# Patient Record
Sex: FEMALE | Race: WHITE | NOT HISPANIC OR LATINO | Employment: UNEMPLOYED | URBAN - NONMETROPOLITAN AREA
[De-identification: names, ages, dates, MRNs, and addresses within clinical notes are randomized per-mention and may not be internally consistent; named-entity substitution may affect disease eponyms.]

---

## 2024-03-28 ENCOUNTER — TELEPHONE (OUTPATIENT)
Dept: FAMILY MEDICINE CLINIC | Facility: CLINIC | Age: 34
End: 2024-03-28
Payer: COMMERCIAL

## 2024-03-28 DIAGNOSIS — Z00.00 PHYSICAL EXAM: Primary | ICD-10-CM

## 2024-03-28 NOTE — TELEPHONE ENCOUNTER
Caller: Viktoria Stauffer    Relationship: Self    Best call back number: [phone]     What orders are you requesting (i.e. lab or imaging): FASTING ANNUAL BLOOD WORK    In what timeframe would the patient need to come in: ASAP        Additional notes: PATIENT HAS AN APPT ON 4/4/24 AND WOULD LIKE TO DO BLOODWORK PRIOR SO THEY CAN BE DISCUSSED AT APPT. ONCE SUBMITTED ADVISE  SO PATIENT CAN BE CALLED TO SETUP APPT

## 2024-04-04 ENCOUNTER — OFFICE VISIT (OUTPATIENT)
Dept: FAMILY MEDICINE CLINIC | Facility: CLINIC | Age: 34
End: 2024-04-04
Payer: COMMERCIAL

## 2024-04-04 VITALS
TEMPERATURE: 97.5 F | RESPIRATION RATE: 16 BRPM | DIASTOLIC BLOOD PRESSURE: 74 MMHG | HEART RATE: 81 BPM | OXYGEN SATURATION: 99 % | HEIGHT: 66 IN | BODY MASS INDEX: 31.43 KG/M2 | SYSTOLIC BLOOD PRESSURE: 118 MMHG | WEIGHT: 195.6 LBS

## 2024-04-04 DIAGNOSIS — E66.09 CLASS 1 OBESITY DUE TO EXCESS CALORIES WITHOUT SERIOUS COMORBIDITY WITH BODY MASS INDEX (BMI) OF 31.0 TO 31.9 IN ADULT: Primary | ICD-10-CM

## 2024-04-04 DIAGNOSIS — Z00.00 PHYSICAL EXAM: ICD-10-CM

## 2024-04-04 PROBLEM — E66.811 CLASS 1 OBESITY DUE TO EXCESS CALORIES WITHOUT SERIOUS COMORBIDITY WITH BODY MASS INDEX (BMI) OF 31.0 TO 31.9 IN ADULT: Status: ACTIVE | Noted: 2024-04-04

## 2024-04-04 RX ORDER — NORETHINDRONE 0.35 MG/1
TABLET ORAL
COMMUNITY
Start: 2023-05-25

## 2024-04-04 RX ORDER — SERTRALINE HCL 25 MG
TABLET ORAL
COMMUNITY
Start: 2023-05-25

## 2024-04-04 NOTE — ASSESSMENT & PLAN NOTE
Patient's (Body mass index is 31.57 kg/m².) indicates that they are obese (BMI >30) with health conditions that include none . Weight is newly identified. BMI  is above average; BMI management plan is completed. We discussed portion control, increasing exercise, and an lee-based approach such as MyFitness Pal or Lose It.

## 2024-04-04 NOTE — PROGRESS NOTES
"    Office Note     Name: Viktoria Stauffer    : 1990     MRN: [id number]     Chief Complaint  Establish Care (\"General health and possibly weight loss discussion\" )    Subjective     History of Present Illness:  Viktoria Stauffer is a 34 y.o. female who presents today for:    - patient got .   patient had a well check and ALT was 17 weight 133.  In 2016 weight 144.  2017 weight 139.8.  2018 weight 145.  2019 had a baby in September ALT was 14 and weight was 149.   weight was 181.   weight 186.   weight 179.   had a baby in May.   -Most recent labs this year showed ALT of 68.  Weight today 195    -eats mostly at home  -does enjoy sweets      Past Medical History:   Past Medical History:   Diagnosis Date    Anxiety 10/01/2019    Took Sertraline for 6mo. after birth of 1st child in . Stopped it. Taking Zoloft 25mg since birth of 2nd child in May 2023.    History of medical problems     Left leg has a large varicose vein    Obesity 2020    Never \"lost\" pregnancy weight from 1st child. Added more with 2nd child.       Past Surgical History: History reviewed. No pertinent surgical history.    Immunizations:   Immunization History   Administered Date(s) Administered    COVID-19 (MODERNA) 1st,2nd,3rd Dose Monovalent 2021, 2021    COVID-19 (MODERNA) Monovalent Original Booster 2021    COVID-19 (PFIZER) BIVALENT 12+YRS 2022    COVID-19 F23 (PFIZER) 12YRS+ (COMIRNATY) 10/21/2023        Medications:     Current Outpatient Medications:     Jencycla 0.35 MG tablet, , Disp: , Rfl:     Zoloft 25 MG tablet, , Disp: , Rfl:     Allergies:   No Known Allergies    Family History:   Family History   Problem Relation Age of Onset    Alcohol abuse Father         drinks daily       Social History:   Social History     Socioeconomic History    Marital status:    Tobacco Use    Smoking status: Never    Smokeless tobacco: Never   Substance and Sexual Activity    Alcohol " "use: Yes     Alcohol/week: 1.0 standard drink of alcohol     Types: 1 Glasses of wine per week     Comment: Casual    Drug use: Never    Sexual activity: Yes     Partners: Male     Birth control/protection: Birth control pill         Objective     Vital Signs  /74 (BP Location: Left arm, Patient Position: Sitting, Cuff Size: Adult)   Pulse 81   Temp 97.5 °F (36.4 °C) (Temporal)   Resp 16   Ht 167.6 cm (66\")   Wt 88.7 kg (195 lb 9.6 oz)   SpO2 99%   BMI 31.57 kg/m²   Estimated body mass index is 31.57 kg/m² as calculated from the following:    Height as of this encounter: 167.6 cm (66\").    Weight as of this encounter: 88.7 kg (195 lb 9.6 oz).        Physical Exam  Constitutional:       General: She is not in acute distress.     Appearance: Normal appearance.   HENT:      Head: Normocephalic and atraumatic.   Eyes:      Conjunctiva/sclera: Conjunctivae normal.   Pulmonary:      Effort: Pulmonary effort is normal.   Neurological:      Mental Status: She is alert.   Psychiatric:         Mood and Affect: Mood normal.         Behavior: Behavior normal.         Thought Content: Thought content normal.          Assessment and Plan     Diagnoses and all orders for this visit:    1. Class 1 obesity due to excess calories without serious comorbidity with body mass index (BMI) of 31.0 to 31.9 in adult (Primary)  Assessment & Plan:  Patient's (Body mass index is 31.57 kg/m².) indicates that they are obese (BMI >30) with health conditions that include none . Weight is newly identified. BMI  is above average; BMI management plan is completed. We discussed portion control, increasing exercise, and an lee-based approach such as [employer] Pal or Lose It.       2. Physical exam  -     Hemoglobin A1c; Future  -     Vitamin D,25-Hydroxy; Future  -     Hemoglobin A1c  -     Vitamin D,25-Hydroxy      Patient presents today to discuss weight.  We did go over her yearly healthcare that she had done through insurance at work.  " She does have 1 elevated liver enzyme.  She has not had a liver ultrasound.  Will work on lifestyle modifications with a goal to lose weight and recheck in 6 months.  If still elevated will pursue a liver ultrasound.  We did extensive counseling on dietary changes and I did recommend that she log her food on an lee in order to make sure she is hitting protein and fiber goals.  She will follow-up in 4 weeks       Follow Up  Return in about 4 weeks (around 5/2/2024) for Next scheduled follow up.    DO YANG Krishnan [facility]  [address] [place] [id number]  [phone]    NOTE TO PATIENT: The 21st Century Cures Act makes medical notes like these available to patients in the interest of transparency. However, be advised this is a medical document. It is intended as peer to peer communication. It is written in medical language and may contain abbreviations or verbiage that are unfamiliar. It may appear blunt or direct. Medical documents are intended to carry relevant information, facts as evident, and the clinical opinion of the practitioner.

## 2024-04-05 LAB
25(OH)D3+25(OH)D2 SERPL-MCNC: 19.9 NG/ML (ref 30–100)
HBA1C MFR BLD: 5.5 % (ref 4.8–5.6)

## 2024-05-02 ENCOUNTER — OFFICE VISIT (OUTPATIENT)
Dept: FAMILY MEDICINE CLINIC | Facility: CLINIC | Age: 34
End: 2024-05-02
Payer: COMMERCIAL

## 2024-05-02 VITALS
BODY MASS INDEX: 31.34 KG/M2 | DIASTOLIC BLOOD PRESSURE: 84 MMHG | HEART RATE: 76 BPM | SYSTOLIC BLOOD PRESSURE: 126 MMHG | WEIGHT: 195 LBS | HEIGHT: 66 IN | OXYGEN SATURATION: 98 %

## 2024-05-02 DIAGNOSIS — E66.09 CLASS 1 OBESITY DUE TO EXCESS CALORIES WITHOUT SERIOUS COMORBIDITY WITH BODY MASS INDEX (BMI) OF 31.0 TO 31.9 IN ADULT: Primary | ICD-10-CM

## 2024-05-02 PROCEDURE — 99214 OFFICE O/P EST MOD 30 MIN: CPT | Performed by: STUDENT IN AN ORGANIZED HEALTH CARE EDUCATION/TRAINING PROGRAM

## 2024-05-02 NOTE — PROGRESS NOTES
"    Office Note     Name: Viktoria Stauffer    : 1990     MRN: [id number]     Chief Complaint  Weight Loss (1 month check up, diet and exercise )    Subjective     History of Present Illness:  Viktoria Stauffer is a 34 y.o. female who presents today for:    -has been on more walks  -doing 10 min exercising from [employer]  -working on logging food  -has noticed she is eating emotionally, would like to seek out counseling   -getting enough protein but overall calorie count is still too high to lose weight  -drinking at least 60oz per day     Past Medical History:   Past Medical History:   Diagnosis Date    Anxiety 10/01/2019    Took Sertraline for 6mo. after birth of 1st child in . Stopped it. Taking Zoloft 25mg since birth of 2nd child in May 2023.    History of medical problems     Left leg has a large varicose vein    Obesity 2020    Never \"lost\" pregnancy weight from 1st child. Added more with 2nd child.       Past Surgical History: History reviewed. No pertinent surgical history.    Immunizations:   Immunization History   Administered Date(s) Administered    COVID-19 (MODERNA) 1st,2nd,3rd Dose Monovalent 2021, 2021    COVID-19 (MODERNA) Monovalent Original Booster 2021    COVID-19 (PFIZER) BIVALENT 12+YRS 2022    COVID-19 F23 (PFIZER) 12YRS+ (COMIRNATY) 10/21/2023        Medications:     Current Outpatient Medications:     Jencycla 0.35 MG tablet, , Disp: , Rfl:     Zoloft 25 MG tablet, , Disp: , Rfl:     Allergies:   No Known Allergies    Family History:   Family History   Problem Relation Age of Onset    Alcohol abuse Father         drinks daily       Social History:   Social History     Socioeconomic History    Marital status:    Tobacco Use    Smoking status: Never    Smokeless tobacco: Never   Vaping Use    Vaping status: Never Used   Substance and Sexual Activity    Alcohol use: Yes     Alcohol/week: 1.0 standard drink of alcohol     Types: 1 Glasses of wine per week " "    Comment: Casual    Drug use: Never    Sexual activity: Yes     Partners: Male     Birth control/protection: Birth control pill         Objective     Vital Signs  /84 (BP Location: Left arm, Patient Position: Sitting, Cuff Size: Adult)   Pulse 76   Ht 167.6 cm (65.98\")   Wt 88.5 kg (195 lb)   SpO2 98%   BMI 31.49 kg/m²   Estimated body mass index is 31.49 kg/m² as calculated from the following:    Height as of this encounter: 167.6 cm (65.98\").    Weight as of this encounter: 88.5 kg (195 lb).            Physical Exam  Constitutional:       General: She is not in acute distress.     Appearance: Normal appearance.   HENT:      Head: Normocephalic and atraumatic.   Eyes:      Conjunctiva/sclera: Conjunctivae normal.   Pulmonary:      Effort: Pulmonary effort is normal.   Neurological:      Mental Status: She is alert.   Psychiatric:         Mood and Affect: Mood normal.         Behavior: Behavior normal.         Thought Content: Thought content normal.          Assessment and Plan     Diagnoses and all orders for this visit:    1. Class 1 obesity due to excess calories without serious comorbidity with body mass index (BMI) of 31.0 to 31.9 in adult (Primary)    Patient presents today to follow-up on lifestyle modifications for weight loss.  Weight is the same as it was 4 weeks ago.  She has become more active and she is looking foods.  She is setting her protein goals but overall calorie count is too high to lose weight.  She will work on that.  I recommended she drink more water.  She can continue to increase her physical activity as well.  She has noticed emotional eating components.  Is interested in seeking counseling.  We discussed counseling options and she we will review the psychology today website and find someone who feels like it is compatible.  Let her know that we can send a referral if necessary.  We did discuss medications again today.  She would like to avoid for now but does plan to " enroll in a 90-day course through her insurance company so that we could have the option of medication coverage in the future.  Will have her follow-up in 3 months and we will repeat blood work at that visit.         Follow Up  Return in about 3 months (around 8/2/2024) for Next scheduled follow up.    DO YANG Krishnan [facility]  [address] [facility] [id number] [phone]    NOTE TO PATIENT: The 21st Century Cures Act makes medical notes like these available to patients in the interest of transparency. However, be advised this is a medical document. It is intended as peer to peer communication. It is written in medical language and may contain abbreviations or verbiage that are unfamiliar. It may appear blunt or direct. Medical documents are intended to carry relevant information, facts as evident, and the clinical opinion of the practitioner.

## 2024-07-29 ENCOUNTER — TELEPHONE (OUTPATIENT)
Dept: FAMILY MEDICINE CLINIC | Facility: CLINIC | Age: 34
End: 2024-07-29

## 2024-07-29 DIAGNOSIS — Z00.00 ENCOUNTER FOR WELLNESS EXAMINATION IN ADULT: Primary | ICD-10-CM

## 2024-07-29 NOTE — TELEPHONE ENCOUNTER
Caller: Viktoria Stauffer    Relationship: Self    Best call back number: [phone]    What orders are you requesting (i.e. lab or imaging): LABS    In what timeframe would the patient need to come in:     BEFORE APPOINTMENT 8/2  WANTS TO HAVE RESULTS AT HER APPOINTMENT    Additional notes:     PLEASE CALL WHEN ORDERS HAVE BEEN PUT IN SO SHE CAN COME GET BLOOD HEIDY

## 2024-07-30 ENCOUNTER — LAB (OUTPATIENT)
Dept: FAMILY MEDICINE CLINIC | Facility: CLINIC | Age: 34
End: 2024-07-30
Payer: COMMERCIAL

## 2024-07-30 DIAGNOSIS — Z00.00 ENCOUNTER FOR WELLNESS EXAMINATION IN ADULT: ICD-10-CM

## 2024-07-30 PROCEDURE — 36415 COLL VENOUS BLD VENIPUNCTURE: CPT | Performed by: STUDENT IN AN ORGANIZED HEALTH CARE EDUCATION/TRAINING PROGRAM

## 2024-07-31 LAB
ALBUMIN SERPL-MCNC: 4.5 G/DL (ref 3.9–4.9)
ALP SERPL-CCNC: 103 IU/L (ref 44–121)
ALT SERPL-CCNC: 29 IU/L (ref 0–32)
AST SERPL-CCNC: 19 IU/L (ref 0–40)
BASOPHILS # BLD AUTO: 0 X10E3/UL (ref 0–0.2)
BASOPHILS NFR BLD AUTO: 1 %
BILIRUB SERPL-MCNC: 0.3 MG/DL (ref 0–1.2)
BUN SERPL-MCNC: 16 MG/DL (ref 6–20)
BUN/CREAT SERPL: 22 (ref 9–23)
CALCIUM SERPL-MCNC: 9.2 MG/DL (ref 8.7–10.2)
CHLORIDE SERPL-SCNC: 105 MMOL/L (ref 96–106)
CHOLEST SERPL-MCNC: 214 MG/DL (ref 100–199)
CO2 SERPL-SCNC: 22 MMOL/L (ref 20–29)
CREAT SERPL-MCNC: 0.74 MG/DL (ref 0.57–1)
EGFRCR SERPLBLD CKD-EPI 2021: 107 ML/MIN/1.73
EOSINOPHIL # BLD AUTO: 0.1 X10E3/UL (ref 0–0.4)
EOSINOPHIL NFR BLD AUTO: 1 %
ERYTHROCYTE [DISTWIDTH] IN BLOOD BY AUTOMATED COUNT: 12.9 % (ref 11.7–15.4)
GLOBULIN SER CALC-MCNC: 2.4 G/DL (ref 1.5–4.5)
GLUCOSE SERPL-MCNC: 83 MG/DL (ref 70–99)
HBA1C MFR BLD: 5.6 % (ref 4.8–5.6)
HCT VFR BLD AUTO: 43.8 % (ref 34–46.6)
HCV IGG SERPL QL IA: NON REACTIVE
HDLC SERPL-MCNC: 58 MG/DL
HGB BLD-MCNC: 14 G/DL (ref 11.1–15.9)
IMM GRANULOCYTES # BLD AUTO: 0 X10E3/UL (ref 0–0.1)
IMM GRANULOCYTES NFR BLD AUTO: 0 %
LDLC SERPL CALC-MCNC: 138 MG/DL (ref 0–99)
LYMPHOCYTES # BLD AUTO: 1.9 X10E3/UL (ref 0.7–3.1)
LYMPHOCYTES NFR BLD AUTO: 31 %
MCH RBC QN AUTO: 27.8 PG (ref 26.6–33)
MCHC RBC AUTO-ENTMCNC: 32 G/DL (ref 31.5–35.7)
MCV RBC AUTO: 87 FL (ref 79–97)
MONOCYTES # BLD AUTO: 0.4 X10E3/UL (ref 0.1–0.9)
MONOCYTES NFR BLD AUTO: 6 %
NEUTROPHILS # BLD AUTO: 3.8 X10E3/UL (ref 1.4–7)
NEUTROPHILS NFR BLD AUTO: 61 %
PLATELET # BLD AUTO: 257 X10E3/UL (ref 150–450)
POTASSIUM SERPL-SCNC: 4.7 MMOL/L (ref 3.5–5.2)
PROT SERPL-MCNC: 6.9 G/DL (ref 6–8.5)
RBC # BLD AUTO: 5.04 X10E6/UL (ref 3.77–5.28)
SODIUM SERPL-SCNC: 140 MMOL/L (ref 134–144)
TRIGL SERPL-MCNC: 101 MG/DL (ref 0–149)
VLDLC SERPL CALC-MCNC: 18 MG/DL (ref 5–40)
WBC # BLD AUTO: 6.2 X10E3/UL (ref 3.4–10.8)

## 2024-08-02 ENCOUNTER — OFFICE VISIT (OUTPATIENT)
Dept: FAMILY MEDICINE CLINIC | Facility: CLINIC | Age: 34
End: 2024-08-02
Payer: COMMERCIAL

## 2024-08-02 VITALS
BODY MASS INDEX: 31.48 KG/M2 | HEIGHT: 66 IN | RESPIRATION RATE: 16 BRPM | HEART RATE: 81 BPM | WEIGHT: 195.9 LBS | DIASTOLIC BLOOD PRESSURE: 68 MMHG | SYSTOLIC BLOOD PRESSURE: 120 MMHG | OXYGEN SATURATION: 99 %

## 2024-08-02 DIAGNOSIS — E66.09 CLASS 1 OBESITY DUE TO EXCESS CALORIES WITHOUT SERIOUS COMORBIDITY WITH BODY MASS INDEX (BMI) OF 31.0 TO 31.9 IN ADULT: Primary | ICD-10-CM

## 2024-08-02 DIAGNOSIS — F32.A DEPRESSION, UNSPECIFIED DEPRESSION TYPE: ICD-10-CM

## 2024-08-02 DIAGNOSIS — E78.2 MIXED HYPERLIPIDEMIA: ICD-10-CM

## 2024-08-02 PROCEDURE — 99214 OFFICE O/P EST MOD 30 MIN: CPT | Performed by: STUDENT IN AN ORGANIZED HEALTH CARE EDUCATION/TRAINING PROGRAM

## 2024-08-02 RX ORDER — BUPROPION HYDROCHLORIDE 150 MG/1
150 TABLET ORAL DAILY
Qty: 90 TABLET | Refills: 1 | Status: SHIPPED | OUTPATIENT
Start: 2024-08-02

## 2024-08-02 RX ORDER — ERGOCALCIFEROL 1.25 MG/1
50000 CAPSULE ORAL WEEKLY
COMMUNITY

## 2024-08-02 NOTE — PROGRESS NOTES
"    Office Note     Name: Viktoria Stauffer    : 1990     MRN: [id number]     Chief Complaint  Obesity (3 month f/u) and Hyperlipidemia    Subjective     History of Present Illness:  Viktoria Stauffer is a 34 y.o. female who presents today for:    Obesity  -Has not lost weight despite implementing some of the eating changes.  Not working out routinely    HLD  -Working on lifestyle modifications    Mood  -Has been on low-dose of Zoloft which has been helpful    Past Medical History:   Past Medical History:   Diagnosis Date    Anxiety 10/01/2019    Took Sertraline for 6mo. after birth of 1st child in . Stopped it. Taking Zoloft 25mg since birth of 2nd child in May 2023.    History of medical problems     Left leg has a large varicose vein    Obesity 2020    Never \"lost\" pregnancy weight from 1st child. Added more with 2nd child.       Past Surgical History: History reviewed. No pertinent surgical history.    Immunizations:   Immunization History   Administered Date(s) Administered    COVID-19 (MODERNA) 1st,2nd,3rd Dose Monovalent 2021, 2021    COVID-19 (MODERNA) Monovalent Original Booster 2021    COVID-19 (PFIZER) BIVALENT 12+YRS 2022    COVID-19 F23 (PFIZER) 12YRS+ (COMIRNATY) 10/21/2023        Medications:     Current Outpatient Medications:     Jencycla 0.35 MG tablet, , Disp: , Rfl:     vitamin D (ERGOCALCIFEROL) 1.25 MG (96210 UT) capsule capsule, Take 1 capsule by mouth 1 (One) Time Per Week., Disp: , Rfl:     buPROPion XL (Wellbutrin XL) 150 MG 24 hr tablet, Take 1 tablet by mouth Daily., Disp: 90 tablet, Rfl: 1    Allergies:   No Known Allergies    Family History:   Family History   Problem Relation Age of Onset    Alcohol abuse Father         drinks daily       Social History:   Social History     Socioeconomic History    Marital status:    Tobacco Use    Smoking status: Never     Passive exposure: Never    Smokeless tobacco: Never   Vaping Use    Vaping status: " "Never Used   Substance and Sexual Activity    Alcohol use: Yes     Alcohol/week: 1.0 standard drink of alcohol     Types: 1 Glasses of wine per week     Comment: Casual    Drug use: Never    Sexual activity: Yes     Partners: Male     Birth control/protection: Birth control pill         Objective     Vital Signs  /68 (BP Location: Right arm, Patient Position: Sitting, Cuff Size: Large Adult)   Pulse 81   Resp 16   Ht 167.6 cm (65.98\")   Wt 88.9 kg (195 lb 14.4 oz)   SpO2 99%   BMI 31.64 kg/m²   Estimated body mass index is 31.64 kg/m² as calculated from the following:    Height as of this encounter: 167.6 cm (65.98\").    Weight as of this encounter: 88.9 kg (195 lb 14.4 oz).            Physical Exam  Constitutional:       General: She is not in acute distress.     Appearance: Normal appearance.   HENT:      Head: Normocephalic and atraumatic.   Eyes:      Conjunctiva/sclera: Conjunctivae normal.   Pulmonary:      Effort: Pulmonary effort is normal.   Neurological:      Mental Status: She is alert.   Psychiatric:         Mood and Affect: Mood normal.         Behavior: Behavior normal.         Thought Content: Thought content normal.          Assessment and Plan     Diagnoses and all orders for this visit:    1. Class 1 obesity due to excess calories without serious comorbidity with body mass index (BMI) of 31.0 to 31.9 in adult (Primary)  -     buPROPion XL (Wellbutrin XL) 150 MG 24 hr tablet; Take 1 tablet by mouth Daily.  Dispense: 90 tablet; Refill: 1    2. Mixed hyperlipidemia  -     Cancel: Lipid Panel; Future    3. Depression, unspecified depression type      Regarding obesity at last appointment we discussed the possibility of medications.  Patient is still nursing her 15-month-old child.  Discussed that GLP-1 medications are not considered safe during breast-feeding.  Discussed other medication options.  On medication we discussed his Contrave and would want to avoid naltrexone during " breast-feeding but could try Wellbutrin and discontinue her Zoloft which may help both mood and the emotional component of eating which does tend to make it difficult for her to lose weight.    Patient also here to follow-up on cholesterol and she already had her lipids rechecked.  They are somewhat improved.  We will continue to work on lifestyle modifications.    For depression we will be transitioning from Zoloft to Wellbutrin.  Follow-up in 6 months           Follow Up  Return in about 6 months (around 2/2/2025) for Next scheduled follow up.    DO YANG Krishnan [facility]  [address] [place] [id number]  [phone]    NOTE TO PATIENT: The 21st Century Cures Act makes medical notes like these available to patients in the interest of transparency. However, be advised this is a medical document. It is intended as peer to peer communication. It is written in medical language and may contain abbreviations or verbiage that are unfamiliar. It may appear blunt or direct. Medical documents are intended to carry relevant information, facts as evident, and the clinical opinion of the practitioner.

## 2024-08-31 ENCOUNTER — OFFICE VISIT (OUTPATIENT)
Age: 34
End: 2024-08-31

## 2024-08-31 VITALS
WEIGHT: 194 LBS | OXYGEN SATURATION: 98 % | DIASTOLIC BLOOD PRESSURE: 78 MMHG | BODY MASS INDEX: 33.12 KG/M2 | SYSTOLIC BLOOD PRESSURE: 130 MMHG | HEART RATE: 104 BPM | TEMPERATURE: 97.6 F | HEIGHT: 64 IN

## 2024-08-31 DIAGNOSIS — L24.9 IRRITANT CONTACT DERMATITIS, UNSPECIFIED TRIGGER: Primary | ICD-10-CM

## 2024-08-31 RX ORDER — DEXAMETHASONE SODIUM PHOSPHATE 4 MG/ML
4 INJECTION, SOLUTION INTRA-ARTICULAR; INTRALESIONAL; INTRAMUSCULAR; INTRAVENOUS; SOFT TISSUE ONCE
Status: COMPLETED | OUTPATIENT
Start: 2024-08-31 | End: 2024-08-31

## 2024-08-31 RX ORDER — BUPROPION HYDROCHLORIDE 150 MG/1
150 TABLET ORAL DAILY
COMMUNITY
Start: 2024-08-02

## 2024-08-31 RX ORDER — ERGOCALCIFEROL 1.25 MG/1
50000 CAPSULE, LIQUID FILLED ORAL WEEKLY
COMMUNITY

## 2024-08-31 RX ORDER — PREDNISONE 20 MG/1
20 TABLET ORAL 2 TIMES DAILY
Qty: 10 TABLET | Refills: 0 | Status: SHIPPED | OUTPATIENT
Start: 2024-08-31 | End: 2024-09-05

## 2024-08-31 RX ORDER — DEXAMETHASONE SODIUM PHOSPHATE 4 MG/ML
4 INJECTION, SOLUTION INTRA-ARTICULAR; INTRALESIONAL; INTRAMUSCULAR; INTRAVENOUS; SOFT TISSUE ONCE
Status: DISCONTINUED | OUTPATIENT
Start: 2024-08-31 | End: 2024-08-31

## 2024-08-31 RX ADMIN — DEXAMETHASONE SODIUM PHOSPHATE 4 MG: 4 INJECTION, SOLUTION INTRA-ARTICULAR; INTRALESIONAL; INTRAMUSCULAR; INTRAVENOUS; SOFT TISSUE at 16:47

## 2024-08-31 NOTE — PROGRESS NOTES
Natividad Wang (:  1990) is a 34 y.o. female,New patient, here for evaluation of the following chief complaint(s):  Rash (Patient c/o having a rash/hives on her arms, legs, face and stomach x3 days)      ASSESSMENT/PLAN:    ICD-10-CM    1. Irritant contact dermatitis, unspecified trigger  L24.9 predniSONE (DELTASONE) 20 MG tablet     dexAMETHasone (DECADRON) injection 4 mg     DISCONTINUED: dexAMETHasone (DECADRON) injection 4 mg        Patient is experiencing contact dermatitis, likely from heat and sweat based on areas of rash. She received a decadron injection while in the clinic today. Encouraged her to take prednisone as prescribed, beginning tomorrow, keep areas dry and open to air, refrain from scratching, and use topical anti itch as needed. Return for worsening or persistent symptoms. Patient is understanding and agreeable to this plan.     Dx Disposition: heat rash, fungal rash, allergic dermatitis  Education and handout provided on diagnosis and management of symptoms.   AVS reviewed with patient. Follow up as needed in UC or with PCP for new or worsening symptoms.   Return if symptoms worsen or fail to improve.    SUBJECTIVE/OBJECTIVE:  Patient presents to the clinic with complaints of itchy hives all over. This started Thursday evening. Denies any changes in detergents, soaps, lotions, etc. She did switch from zoloft to wellbutrin about one month ago. She has had a stressful week. She has tried benadryl with no relief.        History provided by:  Patient   used: No    Rash  Pertinent negatives include no fatigue or fever.       Vitals:    24 1548   BP: 130/78   Site: Right Upper Arm   Position: Sitting   Cuff Size: Large Adult   Pulse: (!) 104   Temp: 97.6 °F (36.4 °C)   TempSrc: Oral   SpO2: 98%   Weight: 88 kg (194 lb)   Height: 1.626 m (5' 4\")       Review of Systems   Constitutional:  Negative for chills, fatigue and fever.   Musculoskeletal:  Negative for myalgias.

## 2025-01-30 DIAGNOSIS — Z00.00 ENCOUNTER FOR WELLNESS EXAMINATION IN ADULT: Primary | ICD-10-CM

## 2025-02-03 ENCOUNTER — LAB (OUTPATIENT)
Dept: FAMILY MEDICINE CLINIC | Facility: CLINIC | Age: 35
End: 2025-02-03
Payer: COMMERCIAL

## 2025-02-03 DIAGNOSIS — Z00.00 ENCOUNTER FOR WELLNESS EXAMINATION IN ADULT: ICD-10-CM

## 2025-02-04 LAB
25(OH)D3+25(OH)D2 SERPL-MCNC: 12.9 NG/ML (ref 30–100)
ALBUMIN SERPL-MCNC: 4.8 G/DL (ref 3.9–4.9)
ALP SERPL-CCNC: 84 IU/L (ref 44–121)
ALT SERPL-CCNC: 12 IU/L (ref 0–32)
AST SERPL-CCNC: 16 IU/L (ref 0–40)
BASOPHILS # BLD AUTO: 0 X10E3/UL (ref 0–0.2)
BASOPHILS NFR BLD AUTO: 1 %
BILIRUB SERPL-MCNC: <0.2 MG/DL (ref 0–1.2)
BUN SERPL-MCNC: 12 MG/DL (ref 6–20)
BUN/CREAT SERPL: 16 (ref 9–23)
CALCIUM SERPL-MCNC: 9.8 MG/DL (ref 8.7–10.2)
CHLORIDE SERPL-SCNC: 103 MMOL/L (ref 96–106)
CHOLEST SERPL-MCNC: 167 MG/DL (ref 100–199)
CO2 SERPL-SCNC: 22 MMOL/L (ref 20–29)
CREAT SERPL-MCNC: 0.74 MG/DL (ref 0.57–1)
EGFRCR SERPLBLD CKD-EPI 2021: 107 ML/MIN/1.73
EOSINOPHIL # BLD AUTO: 0.3 X10E3/UL (ref 0–0.4)
EOSINOPHIL NFR BLD AUTO: 5 %
ERYTHROCYTE [DISTWIDTH] IN BLOOD BY AUTOMATED COUNT: 12 % (ref 11.7–15.4)
GLOBULIN SER CALC-MCNC: 2.7 G/DL (ref 1.5–4.5)
GLUCOSE SERPL-MCNC: 87 MG/DL (ref 70–99)
HBA1C MFR BLD: 5.3 % (ref 4.8–5.6)
HCT VFR BLD AUTO: 39.6 % (ref 34–46.6)
HDLC SERPL-MCNC: 63 MG/DL
HGB BLD-MCNC: 13 G/DL (ref 11.1–15.9)
IMM GRANULOCYTES # BLD AUTO: 0 X10E3/UL (ref 0–0.1)
IMM GRANULOCYTES NFR BLD AUTO: 0 %
LDLC SERPL CALC-MCNC: 91 MG/DL (ref 0–99)
LYMPHOCYTES # BLD AUTO: 2.8 X10E3/UL (ref 0.7–3.1)
LYMPHOCYTES NFR BLD AUTO: 44 %
MCH RBC QN AUTO: 29.8 PG (ref 26.6–33)
MCHC RBC AUTO-ENTMCNC: 32.8 G/DL (ref 31.5–35.7)
MCV RBC AUTO: 91 FL (ref 79–97)
MONOCYTES # BLD AUTO: 0.4 X10E3/UL (ref 0.1–0.9)
MONOCYTES NFR BLD AUTO: 6 %
NEUTROPHILS # BLD AUTO: 2.8 X10E3/UL (ref 1.4–7)
NEUTROPHILS NFR BLD AUTO: 44 %
PLATELET # BLD AUTO: 282 X10E3/UL (ref 150–450)
POTASSIUM SERPL-SCNC: 4.6 MMOL/L (ref 3.5–5.2)
PROT SERPL-MCNC: 7.5 G/DL (ref 6–8.5)
RBC # BLD AUTO: 4.36 X10E6/UL (ref 3.77–5.28)
SODIUM SERPL-SCNC: 138 MMOL/L (ref 134–144)
T4 FREE SERPL-MCNC: 1.28 NG/DL (ref 0.82–1.77)
TRIGL SERPL-MCNC: 69 MG/DL (ref 0–149)
TSH SERPL DL<=0.005 MIU/L-ACNC: 4 UIU/ML (ref 0.45–4.5)
VLDLC SERPL CALC-MCNC: 13 MG/DL (ref 5–40)
WBC # BLD AUTO: 6.4 X10E3/UL (ref 3.4–10.8)

## 2025-02-05 DIAGNOSIS — E66.09 CLASS 1 OBESITY DUE TO EXCESS CALORIES WITHOUT SERIOUS COMORBIDITY WITH BODY MASS INDEX (BMI) OF 31.0 TO 31.9 IN ADULT: ICD-10-CM

## 2025-02-05 DIAGNOSIS — E66.811 CLASS 1 OBESITY DUE TO EXCESS CALORIES WITHOUT SERIOUS COMORBIDITY WITH BODY MASS INDEX (BMI) OF 31.0 TO 31.9 IN ADULT: ICD-10-CM

## 2025-02-05 RX ORDER — BUPROPION HYDROCHLORIDE 150 MG/1
150 TABLET ORAL DAILY
Qty: 90 TABLET | Refills: 1 | Status: SHIPPED | OUTPATIENT
Start: 2025-02-05

## 2025-02-05 RX ORDER — ERGOCALCIFEROL 1.25 MG/1
50000 CAPSULE, LIQUID FILLED ORAL WEEKLY
Qty: 5 CAPSULE | Refills: 0 | Status: SHIPPED | OUTPATIENT
Start: 2025-02-05

## 2025-02-05 RX ORDER — NORETHINDRONE 0.35 MG/1
TABLET ORAL
Qty: 28 TABLET | Status: CANCELLED | OUTPATIENT
Start: 2025-02-05

## 2025-02-07 ENCOUNTER — OFFICE VISIT (OUTPATIENT)
Dept: FAMILY MEDICINE CLINIC | Facility: CLINIC | Age: 35
End: 2025-02-07
Payer: COMMERCIAL

## 2025-02-07 VITALS
DIASTOLIC BLOOD PRESSURE: 80 MMHG | HEIGHT: 66 IN | SYSTOLIC BLOOD PRESSURE: 110 MMHG | WEIGHT: 182.7 LBS | HEART RATE: 95 BPM | BODY MASS INDEX: 29.36 KG/M2 | OXYGEN SATURATION: 98 %

## 2025-02-07 DIAGNOSIS — E66.811 CLASS 1 OBESITY DUE TO EXCESS CALORIES WITHOUT SERIOUS COMORBIDITY WITH BODY MASS INDEX (BMI) OF 31.0 TO 31.9 IN ADULT: Primary | ICD-10-CM

## 2025-02-07 DIAGNOSIS — E66.09 CLASS 1 OBESITY DUE TO EXCESS CALORIES WITHOUT SERIOUS COMORBIDITY WITH BODY MASS INDEX (BMI) OF 31.0 TO 31.9 IN ADULT: Primary | ICD-10-CM

## 2025-02-07 PROCEDURE — 99213 OFFICE O/P EST LOW 20 MIN: CPT | Performed by: STUDENT IN AN ORGANIZED HEALTH CARE EDUCATION/TRAINING PROGRAM

## 2025-02-07 NOTE — PROGRESS NOTES
"    Office Note     Name: Viktoria Stauffer    : 1990     MRN: [id number]     Chief Complaint  Obesity    Subjective     History of Present Illness:  Viktoria Stauffer is a 34 y.o. female who presents today for:    Obesity  -mid October started weight watchers and lost 10lb  -mid December into January stalled out due to travel and snow. Then got back on the wagon   -was in a good walking routine before the cold. Sometimes doing weights.  -did buy a [employer] blood sugar monitor (CGM)  -interested in who does body fat analysis scans in [facility] women[facility]     Past Medical History:   Past Medical History:   Diagnosis Date    Anxiety 10/01/2019    Took Sertraline for 6mo. after birth of 1st child in . Stopped it. Taking Zoloft 25mg since birth of 2nd child in May 2023.    History of medical problems 2012    Left leg has a large varicose vein    Obesity 2020    Never \"lost\" pregnancy weight from 1st child. Added more with 2nd child.       Past Surgical History: History reviewed. No pertinent surgical history.    Immunizations:   Immunization History   Administered Date(s) Administered    COVID-19 (MODERNA) 1st,2nd,3rd Dose Monovalent 2021, 2021    COVID-19 (MODERNA) Monovalent Original Booster 2021    COVID-19 (PFIZER) 12YRS+ (COMIRNATY) 10/21/2023    COVID-19 (PFIZER) BIVALENT 12+YRS 2022    Fluzone  >6mos 2024    Fluzone (or Fluarix & Flulaval for VFC) >6mos 10/31/2022    Hpv9 2023    Influenza Injectable Mdck Pf Quad 10/21/2023    Tdap 2023        Medications:     Current Outpatient Medications:     buPROPion XL (Wellbutrin XL) 150 MG 24 hr tablet, Take 1 tablet by mouth Daily., Disp: 90 tablet, Rfl: 1    Jencycla 0.35 MG tablet, , Disp: , Rfl:     vitamin D (ERGOCALCIFEROL) 1.25 MG (01495 UT) capsule capsule, Take 1 capsule by mouth 1 (One) Time Per Week., Disp: 5 capsule, Rfl: 0    Allergies:   No Known Allergies    Family History:   Family History   Problem " "Relation Age of Onset    Alcohol abuse Father         drinks daily       Social History:   Social History     Socioeconomic History    Marital status:    Tobacco Use    Smoking status: Never     Passive exposure: Never    Smokeless tobacco: Never   Vaping Use    Vaping status: Never Used   Substance and Sexual Activity    Alcohol use: Yes     Alcohol/week: 1.0 standard drink of alcohol     Types: 1 Glasses of wine per week     Comment: Casual    Drug use: Never    Sexual activity: Yes     Partners: Male     Birth control/protection: Birth control pill         Objective     Vital Signs  /80 (BP Location: Right arm, Patient Position: Sitting, Cuff Size: Adult)   Pulse 95   Ht 167.6 cm (65.98\")   Wt 82.9 kg (182 lb 11.2 oz)   SpO2 98%   BMI 29.51 kg/m²   Estimated body mass index is 29.51 kg/m² as calculated from the following:    Height as of this encounter: 167.6 cm (65.98\").    Weight as of this encounter: 82.9 kg (182 lb 11.2 oz).            Physical Exam  Constitutional:       General: She is not in acute distress.     Appearance: Normal appearance.   HENT:      Head: Normocephalic and atraumatic.   Eyes:      Conjunctiva/sclera: Conjunctivae normal.   Pulmonary:      Effort: Pulmonary effort is normal.   Neurological:      Mental Status: She is alert.   Psychiatric:         Mood and Affect: Mood normal.         Behavior: Behavior normal.         Thought Content: Thought content normal.          Assessment and Plan     Diagnoses and all orders for this visit:    1. Class 1 obesity due to excess calories without serious comorbidity with body mass index (BMI) of 31.0 to 31.9 in adult (Primary)  -     Ambulatory Referral to Weight Management Program    Patient here today to discuss her weight.  She is been working on lifestyle modifications.  Give her some additional things that she can work on.  Did place referral to weight management program.  She is not interested in pursuing surgery.  Unsure at " this point if she would want to do medications.  Is interested in body analysis.         Follow Up  Return in about 6 months (around 8/7/2025) for Next scheduled follow up.    DO YANG Krishnan [facility] PRIMARY [facility]  [address] [place] [phone]  [phone]    NOTE TO PATIENT: The 21st Century Cures Act makes medical notes like these available to patients in the interest of transparency. However, be advised this is a medical document. It is intended as peer to peer communication. It is written in medical language and may contain abbreviations or verbiage that are unfamiliar. It may appear blunt or direct. Medical documents are intended to carry relevant information, facts as evident, and the clinical opinion of the practitioner.

## 2025-02-07 NOTE — PATIENT INSTRUCTIONS
Health Maintenance, Female  Adopting a healthy lifestyle and getting preventive care can go a long way to promote health and wellness. Talk with your health care provider about what schedule of regular examinations is right for you. This is a good chance for you to check in with your provider about disease prevention and staying healthy.  In between checkups, there are plenty of things you can do on your own. Experts have done a lot of research about which lifestyle changes and preventive measures are most likely to keep you healthy. Ask your health care provider for more information.  Weight and diet  Eat a healthy diet  Be sure to include plenty of vegetables, fruits, low-fat dairy products, and lean protein.  Do not eat a lot of foods high in solid fats, added sugars, or salt.  Get regular exercise. This is one of the most important things you can do for your health.  Most adults should exercise for at least 150 minutes each week. The exercise should increase your heart rate and make you sweat (moderate-intensity exercise).  Most adults should also do strengthening exercises at least twice a week. This is in addition to the moderate-intensity exercise.     Maintain a healthy weight  Body mass index (BMI) is a measurement that can be used to identify possible weight problems. It estimates body fat based on height and weight. Your health care provider can help determine your BMI and help you achieve or maintain a healthy weight.  For females 20 years of age and older:  A BMI below 18.5 is considered underweight.  A BMI of 18.5 to 24.9 is normal.  A BMI of 25 to 29.9 is considered overweight.  A BMI of 30 and above is considered obese.     Watch levels of cholesterol and blood lipids  You should start having your blood tested for lipids and cholesterol at 20 years of age, then have this test every 5 years.  You may need to have your cholesterol levels checked more often if:  Your lipid or cholesterol levels are  high.  You are older than 50 years of age.  You are at high risk for heart disease.     Cancer screening  Lung Cancer  Lung cancer screening is recommended for adults 55-80 years old who are at high risk for lung cancer because of a history of smoking.  A yearly low-dose CT scan of the lungs is recommended for people who:  Currently smoke.  Have quit within the past 15 years.  Have at least a 30-pack-year history of smoking. A pack year is smoking an average of one pack of cigarettes a day for 1 year.  Yearly screening should continue until it has been 15 years since you quit.  Yearly screening should stop if you develop a health problem that would prevent you from having lung cancer treatment.     Breast Cancer  Practice breast self-awareness. This means understanding how your breasts normally appear and feel.  It also means doing regular breast self-exams. Let your health care provider know about any changes, no matter how small.  If you are in your 20s or 30s, you should have a clinical breast exam (CBE) by a health care provider every 1-3 years as part of a regular health exam.  If you are 40 or older, have a CBE every year. Also consider having a breast X-ray (mammogram) every year.  If you have a family history of breast cancer, talk to your health care provider about genetic screening.  If you are at high risk for breast cancer, talk to your health care provider about having an MRI and a mammogram every year.  Breast cancer gene (BRCA) assessment is recommended for women who have family members with BRCA-related cancers. BRCA-related cancers include:  Breast.  Ovarian.  Tubal.  Peritoneal cancers.  Results of the assessment will determine the need for genetic counseling and BRCA1 and BRCA2 testing.     Cervical Cancer  Your health care provider may recommend that you be screened regularly for cancer of the pelvic organs (ovaries, uterus, and vagina). This screening involves a pelvic examination, including  checking for microscopic changes to the surface of your cervix (Pap test). You may be encouraged to have this screening done every 3 years, beginning at age 21.  For women ages 30-65, health care providers may recommend pelvic exams and Pap testing every 3 years, or they may recommend the Pap and pelvic exam, combined with testing for human papilloma virus (HPV), every 5 years. Some types of HPV increase your risk of cervical cancer. Testing for HPV may also be done on women of any age with unclear Pap test results.  Other health care providers may not recommend any screening for nonpregnant women who are considered low risk for pelvic cancer and who do not have symptoms. Ask your health care provider if a screening pelvic exam is right for you.  If you have had past treatment for cervical cancer or a condition that could lead to cancer, you need Pap tests and screening for cancer for at least 20 years after your treatment. If Pap tests have been discontinued, your risk factors (such as having a new sexual partner) need to be reassessed to determine if screening should resume. Some women have medical problems that increase the chance of getting cervical cancer. In these cases, your health care provider may recommend more frequent screening and Pap tests.     Colorectal Cancer  This type of cancer can be detected and often prevented.  Routine colorectal cancer screening usually begins at 50 years of age and continues through 75 years of age.  Your health care provider may recommend screening at an earlier age if you have risk factors for colon cancer.  Your health care provider may also recommend using home test kits to check for hidden blood in the stool.  A small camera at the end of a tube can be used to examine your colon directly (sigmoidoscopy or colonoscopy). This is done to check for the earliest forms of colorectal cancer.  Routine screening usually begins at age 50.  Direct examination of the colon should  be repeated every 5-10 years through 75 years of age. However, you may need to be screened more often if early forms of precancerous polyps or small growths are found.     Skin Cancer  Check your skin from head to toe regularly.  Tell your health care provider about any new moles or changes in moles, especially if there is a change in a mole's shape or color.  Also tell your health care provider if you have a mole that is larger than the size of a pencil eraser.  Always use sunscreen. Apply sunscreen liberally and repeatedly throughout the day.  Protect yourself by wearing long sleeves, pants, a wide-brimmed hat, and sunglasses whenever you are outside.     Heart disease, diabetes, and high blood pressure  High blood pressure causes heart disease and increases the risk of stroke. High blood pressure is more likely to develop in:  People who have blood pressure in the high end of the normal range (130-139/85-89 mm Hg).  People who are overweight or obese.  People who are .  If you are 18-39 years of age, have your blood pressure checked every 3-5 years. If you are 40 years of age or older, have your blood pressure checked every year. You should have your blood pressure measured twice--once when you are at a hospital or clinic, and once when you are not at a hospital or clinic. Record the average of the two measurements. To check your blood pressure when you are not at a hospital or clinic, you can use:  An automated blood pressure machine at a pharmacy.  A home blood pressure monitor.  If you are between 55 years and 79 years old, ask your health care provider if you should take aspirin to prevent strokes.  Have regular diabetes screenings. This involves taking a blood sample to check your fasting blood sugar level.  If you are at a normal weight and have a low risk for diabetes, have this test once every three years after 45 years of age.  If you are overweight and have a high risk for diabetes,  consider being tested at a younger age or more often.  Preventing infection  Hepatitis B  If you have a higher risk for hepatitis B, you should be screened for this virus. You are considered at high risk for hepatitis B if:  You were born in a country where hepatitis B is common. Ask your health care provider which countries are considered high risk.  Your parents were born in a high-risk country, and you have not been immunized against hepatitis B (hepatitis B vaccine).  You have HIV or AIDS.  You use needles to inject street drugs.  You live with someone who has hepatitis B.  You have had sex with someone who has hepatitis B.  You get hemodialysis treatment.  You take certain medicines for conditions, including cancer, organ transplantation, and autoimmune conditions.     Hepatitis C  Blood testing is recommended for:  Everyone born from 1945 through 1965.  Anyone with known risk factors for hepatitis C.     Sexually transmitted infections (STIs)  You should be screened for sexually transmitted infections (STIs) including gonorrhea and chlamydia if:  You are sexually active and are younger than 24 years of age.  You are older than 24 years of age and your health care provider tells you that you are at risk for this type of infection.  Your sexual activity has changed since you were last screened and you are at an increased risk for chlamydia or gonorrhea. Ask your health care provider if you are at risk.  If you do not have HIV, but are at risk, it may be recommended that you take a prescription medicine daily to prevent HIV infection. This is called pre-exposure prophylaxis (PrEP). You are considered at risk if:  You are sexually active and do not regularly use condoms or know the HIV status of your partner(s).  You take drugs by injection.  You are sexually active with a partner who has HIV.     Talk with your health care provider about whether you are at high risk of being infected with HIV. If you choose to  begin PrEP, you should first be tested for HIV. You should then be tested every 3 months for as long as you are taking PrEP.  Pregnancy  If you are premenopausal and you may become pregnant, ask your health care provider about preconception counseling.  If you may become pregnant, take 400 to 800 micrograms (mcg) of folic acid every day.  If you want to prevent pregnancy, talk to your health care provider about birth control (contraception).  Osteoporosis and menopause  Osteoporosis is a disease in which the bones lose minerals and strength with aging. This can result in serious bone fractures. Your risk for osteoporosis can be identified using a bone density scan.  If you are 65 years of age or older, or if you are at risk for osteoporosis and fractures, ask your health care provider if you should be screened.  Ask your health care provider whether you should take a calcium or vitamin D supplement to lower your risk for osteoporosis.  Menopause may have certain physical symptoms and risks.  Hormone replacement therapy may reduce some of these symptoms and risks.  Talk to your health care provider about whether hormone replacement therapy is right for you.  Follow these instructions at home:  Schedule regular health, dental, and eye exams.  Stay current with your immunizations.  Do not use any tobacco products including cigarettes, chewing tobacco, or electronic cigarettes.  If you are pregnant, do not drink alcohol.  If you are breastfeeding, limit how much and how often you drink alcohol.  Limit alcohol intake to no more than 1 drink per day for nonpregnant women. One drink equals 12 ounces of beer, 5 ounces of wine, or 1½ ounces of hard liquor.  Do not use street drugs.  Do not share needles.  Ask your health care provider for help if you need support or information about quitting drugs.  Tell your health care provider if you often feel depressed.  Tell your health care provider if you have ever been abused or do  not feel safe at home.  This information is not intended to replace advice given to you by your health care provider. Make sure you discuss any questions you have with your health care provider.  Document Released: 07/02/2012 Document Revised: 05/25/2017 Document Reviewed: 09/20/2016  [employer] Interactive Patient Education © 2018 [employer]

## 2025-02-10 DIAGNOSIS — E66.3 OVERWEIGHT: Primary | ICD-10-CM

## 2025-03-17 RX ORDER — ERGOCALCIFEROL 1.25 MG/1
50000 CAPSULE, LIQUID FILLED ORAL WEEKLY
Qty: 12 CAPSULE | Refills: 3 | Status: SHIPPED | OUTPATIENT
Start: 2025-03-17

## 2025-06-09 ENCOUNTER — OFFICE VISIT (OUTPATIENT)
Age: 35
End: 2025-06-09
Payer: COMMERCIAL

## 2025-06-09 VITALS
WEIGHT: 181.2 LBS | HEIGHT: 65 IN | HEART RATE: 95 BPM | DIASTOLIC BLOOD PRESSURE: 80 MMHG | SYSTOLIC BLOOD PRESSURE: 120 MMHG | BODY MASS INDEX: 30.19 KG/M2

## 2025-06-09 DIAGNOSIS — E55.9 VITAMIN D DEFICIENCY: ICD-10-CM

## 2025-06-09 DIAGNOSIS — E66.811 CLASS 1 OBESITY DUE TO EXCESS CALORIES WITHOUT SERIOUS COMORBIDITY WITH BODY MASS INDEX (BMI) OF 30.0 TO 30.9 IN ADULT: Primary | ICD-10-CM

## 2025-06-09 DIAGNOSIS — E66.09 CLASS 1 OBESITY DUE TO EXCESS CALORIES WITHOUT SERIOUS COMORBIDITY WITH BODY MASS INDEX (BMI) OF 30.0 TO 30.9 IN ADULT: Primary | ICD-10-CM

## 2025-06-09 PROCEDURE — 99205 OFFICE O/P NEW HI 60 MIN: CPT | Performed by: NURSE PRACTITIONER

## 2025-06-09 NOTE — ASSESSMENT & PLAN NOTE
-continue Vit D supplementation as prescribed by PCP  -Has follow up appointment with PCP in August to reeval

## 2025-06-09 NOTE — PROGRESS NOTES
Procedure Date:  2020    Patient: So Siegel  : 1971    Sedation: MAC    Outpatient History and Physical Review for EGD    Indications: GERD, Dyspepsia and F/U after surgery also    Family History of Colon Cancer or Colon Polyps: No    Current Facility-Administered Medications   Medication Dose Route Frequency Provider Last Rate Last Dose   â¢ lidocaine-prilocaine (EMLA) cream 1 application  1 application Topical PRN Shonna Huerta MD       â¢ lidocaine-sodium bicarbonate 0.9-8.4% injection 0.5-1 mL  0.5-1 mL Subcutaneous PRN Shonna Huerta MD        Or   â¢ lidocaine 1 % injection 5-10 mg  5-10 mg Subcutaneous PRN Shonna Huerta MD       â¢ metoPROLOL tartrate (LOPRESSOR) tablet 25 mg  25 mg Oral Once PRN Shonna Huerta MD       â¢ dextrose (GLUTOSE) 40 % gel 15 g  15 g Oral Once PRN Shonna Huerta MD       â¢ dextrose 50 % injection 25 g  25 g Intravenous PRN Shonna Huerta MD       â¢ insulin regular (human) (HumuLIN R, NovoLIN R) sliding scale injection   Subcutaneous Once PRN Shonna Huerta MD       â¢ sodium chloride 0.9 % flush bag 25 mL  25 mL Intravenous PRN Shonna Huerta MD       â¢ sodium chloride (PF) 0.9 % injection 2 mL  2 mL Intracatheter 2 times per day Shonna Huerta MD       â¢ lactated ringers infusion   Intravenous Continuous Shonna Huerta MD       â¢ sodium chloride 0.9% infusion   Intravenous Continuous Shonna Huerta MD       â¢ dextrose 5 % infusion   Intravenous Continuous PRADONAY Huerta MD       â¢ sodium chloride 0.9% infusion   Intravenous Continuous Senait Gallo MD       â¢ sodium chloride (PF) 0.9 % injection 2 mL  2 mL Intracatheter 2 times per day Senait Gallo MD       â¢ sodium chloride 0.9 % flush bag 25 mL  25 mL Intravenous PRN Senait Gallo MD       â¢ lidocaine viscous 2 % oral solution 10 mL  10 mL Mouth/Throat Once PRN Senait Gallo MD           ALLERGIES: no known allergies.             Past Medical History:   Diagnosis Date   â¢ Abscess, neck 10/2019    was treated with antibiotic for lak  [facility]  [address] [place] [id number]      Date: 2025  Patient Name: Viktoria Stauffer  MRN: [id number]  : 1990    Subjective     Chief Complaint  Obesity Management consult, nutrition counseling       History of Present Illness:  Viktoria Stauffer presents to [facility] for obesity management. Personal goal is to lose 30 pounds. She would like to make sustainable lifestyle changes for long lasting weight loss and strength building. Overall, she would like to have a healthy lifestyle.     Weight history:  Highest lifetime weight: 193 pounds. Today's weight is 82.2 kg (181 lb 3.2 oz) pounds.   Weight 5 years ago: 150    Current lifestyle:   The following seem to sabotage weight loss efforts:Lack of time for planning & self, comfort/stress eating, specific cravings like carbohydrates, mindless eating (snacking while working or watching TV), eating too fast, boredom eating, portion sizes, too little protein, and poor sleep    Past diets: weight watchers, NOOM  Past weight loss medications: none    Typical Diet:  Breakfast: Light English muffin or PB2, eggs, protein waffles  Lunch: leftovers or cereal  Dinner: chicken or beef, potatoes, carrots, broccoli, spaghetti; mexican, pizza  Snacks: protein bars, greek yogurt, popcorn, cheese sticks, fruit  Beverages: water, milk, diet soda, coffee    Pertinent medical history:  Pancreatitis: no  Seizures: no  Glaucoma: no  Headaches: no  HTN: no  Heart palpitations: no  Thyroid C cell cancer personal or family hx: no  MEN syndrome personal or family hx: no  Nephrolithiasis: no    PHQ-9 Total Score:   Little interest or pleasure in doing things? Not at all   Feeling down, depressed, or hopeless? Not at all   PHQ-2 Total Score 0                  Review of Systems   Constitutional:  Negative for fatigue.        Positive for weight gain   HENT:  Negative for trouble swallowing.          "Negative for throat swelling   Respiratory:  Negative for shortness of breath and wheezing.         Negative for snoring   Cardiovascular:  Negative for chest pain, palpitations and leg swelling.   Gastrointestinal:  Negative for abdominal pain, constipation, diarrhea, GERD and indigestion.   Endocrine: Negative for cold intolerance, heat intolerance, polydipsia, polyphagia and polyuria.        Negative for loss of hair  Negative for hirsutism     Genitourinary:  Positive for menstrual problem.        Denies menstrual irregularities   Musculoskeletal:  Negative for arthralgias.        Denies exercise limitations  Denies chronic pain   Skin:  Negative for dry skin.        Negative for acne   Neurological:  Negative for headache and memory problem.        Negative for paresthesias   Psychiatric/Behavioral:  Positive for depressed mood. Negative for self-injury, sleep disturbance and suicidal ideas. The patient is nervous/anxious.    All other systems reviewed and are negative.        Objective     Body mass index is 30.62 kg/m².   Body composition analysis completed and showed:   Body Fat %: 40.8     Measurements (in inches)  Measurements (in inches) Waist Circumference: 38   Neck: 14.5  Chest: 42  Hips: 43  Thighs: 40    Vital Signs:   /80 (BP Location: Left arm, Patient Position: Sitting)   Pulse 95   Ht 163.8 cm (64.5\")   Wt 82.2 kg (181 lb 3.2 oz)   BMI 30.62 kg/m²     Physical Exam  Constitutional:       Appearance: Normal appearance.   HENT:      Head: Normocephalic and atraumatic.   Pulmonary:      Effort: Pulmonary effort is normal.   Neurological:      Mental Status: She is alert and oriented to person, place, and time.   Psychiatric:         Mood and Affect: Mood normal.         Thought Content: Thought content normal.          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    Dr Rivas [facility] Gastroesophageal reflux disease    â¢ History of recent blood transfusion 06/24/2019    2 units // for GI bleed and anemia   â¢ HTN (hypertension) 9/24/2014 6/7/2019- not taking meds at this time- pt quit his medication on his own   â¢ Hypertriglyceridemia    â¢ Melena 04/09/2019 and 6/23/2019   â¢ Nicotine dependence 10/22/2014   â¢ Tobacco abuse    â¢ Upper GI bleed 04/09/2019 & 6/24/2019    stomach - blood clot seen on EGD (?GIST) seen on EGD   â¢ Wears glasses      Past Surgical History:   Procedure Laterality Date   â¢ Colonoscopy  06/11/2019    Normal   â¢ Esophagogastroduodenoscopy  04/09/2019    Stomach bleed - (?GIST - mass) blood clot also seen   â¢ Esophagogastroduodenoscopy  06/25/2019    done for GI bleed/melena and anemia // hemoclip placed and area inked   â¢ Gastrectomy partial distal  07/02/2019    Diag Lap robotic distal gastrectomy with bilroth II and gastrojejunostomy // Dr Donato Garza   â¢ Gi endoscopic ultrasound  06/11/2019    Dr. Baig Him: Hyperechoic lesion noted in the distal gastric body confined to the submucosa status post fine-needle biopsy   â¢ Tonsillectomy and adenoidectomy  child         PHYSICAL EXAM:  HEENT: Within normal limits  LUNGS: Lungs clear to auscultation. No cold symptoms present. HEART: S1,S2, regular rate and rhythm, no murmer. NEUROLOGICAL: Within normal limits. MENTAL STATUS: Alert, oriented x3, interactive. SKIN: Warm, dry and intact, no lesions or rashes. GENITOURINARY: N\A    The risks of the procedure including the possibility of bleeding, perforation (possibly resulting in laparotomy/colostomy) aspiration, and the risk of a polypectomy were discussed with the patient who accepts these risks.   Potassium 4.7  4.6    Chloride 105  103    Calcium 9.2  9.8    Albumin 4.5  4.8    Total Bilirubin 0.3  <0.2    Alkaline Phosphatase 103  84    AST (SGOT) 19  16    ALT (SGPT) 29  12    WBC 6.2  6.4    Hemoglobin 14.0  13.0    Hematocrit 43.8  39.6    Platelets 257  282    Total Cholesterol 214  167    Triglycerides 101  69    HDL Cholesterol 58  63    LDL Cholesterol  138  91    Hemoglobin A1C 5.6  5.3             Component  Ref Range & Units (hover) 4 mo ago 1 yr ago   25 Hydroxy, Vitamin D 12.9 Low  19.9 Low           Assessment / Plan       Diagnoses and all orders for this visit:    1. Class 1 obesity due to excess calories without serious comorbidity with body mass index (BMI) of 30.0 to 30.9 in adult (Primary)  Assessment & Plan:  Patient's (Body mass index is 30.62 kg/m².) indicates that they are obese (BMI >30) with health conditions that include none . Weight is newly identified. BMI  is above average; BMI management plan is completed. We discussed low calorie, low carb based diet program, portion control, increasing exercise, and an lee-based approach such as [employer] Pal or Lose It.       -- This is an initial visit. Topics of discussion included obesity as a disease, nutritional education on food groups, exercise, and medications.Patient's past medical history was reviewed in detail and barriers to weight loss were identified and discussed. Past efforts at weight reduction on their own as well as under physician supervision were documented and discussed.  I advised patient to continue routine care with their Primary Care Provider.  --Body composition analysis was reviewed. Short and long-term weight loss goals set up based on this information.  --Patient was instructed on adequate protein, controlled carb and controlled fat intake. Baritastic food journal set up with calorie and macro goals set : calories - [id number], protein - 100 g/day or more , net carbs - 75 g/day or less  .  Patient encouraged  to start journaling daily.  Encouraged that we are not necessarily looking for perfection but starting to learn where calories and macros are staying.  Patient advised that were looking to stay at or below calorie and carbohydrate levels and at or above protein and fiber levels. Asked patient to bring in food journal to next office visit for brief review  --Patient is to try nutritonal/behavioral changes only first   --Fasting labs reviewed from 2/3/25  ----Patient will consider medication use. Discussed medication management options for weight loss including stimulants (phentermine or diethylpropion), Contrave, and GLP-1. Mechanism of action, adverse and side effects, and benefits of each class reviewed.            2. Vitamin D deficiency  Assessment & Plan:  -continue Vit D supplementation as prescribed by PCP  -Has follow up appointment with PCP in August to reeval          I spent 60 minutes caring for Viktoria on this date of service. This time includes time spent by me in the following activities:preparing for the visit, reviewing tests, counseling and educating the patient/family/caregiver, and documenting information in the medical record  Follow Up   Return in about 4 weeks (around 7/7/2025).  Patient was given instructions and counseling regarding her condition or for health maintenance advice. Please see specific information pulled into the AVS if appropriate.     Tyrese Garcia, TOAN  06/09/2025

## 2025-06-09 NOTE — ASSESSMENT & PLAN NOTE
Patient's (Body mass index is 30.62 kg/m².) indicates that they are obese (BMI >30) with health conditions that include none . Weight is newly identified. BMI  is above average; BMI management plan is completed. We discussed low calorie, low carb based diet program, portion control, increasing exercise, and an lee-based approach such as [employer] Pal or Lose It.       -- This is an initial visit. Topics of discussion included obesity as a disease, nutritional education on food groups, exercise, and medications.Patient's past medical history was reviewed in detail and barriers to weight loss were identified and discussed. Past efforts at weight reduction on their own as well as under physician supervision were documented and discussed.  I advised patient to continue routine care with their Primary Care Provider.  --Body composition analysis was reviewed. Short and long-term weight loss goals set up based on this information.  --Patient was instructed on adequate protein, controlled carb and controlled fat intake. [employer] food journal set up with calorie and macro goals set : calories - 0436-1327, protein - 100 g/day or more , net carbs - 75 g/day or less  .  Patient encouraged to start journaling daily.  Encouraged that we are not necessarily looking for perfection but starting to learn where calories and macros are staying.  Patient advised that were looking to stay at or below calorie and carbohydrate levels and at or above protein and fiber levels. Asked patient to bring in food journal to next office visit for brief review  --Patient is to try nutritonal/behavioral changes only first   --Fasting labs reviewed from 2/3/25  ----Patient will consider medication use. Discussed medication management options for weight loss including stimulants (phentermine or diethylpropion), Contrave, and GLP-1. Mechanism of action, adverse and side effects, and benefits of each class reviewed.

## 2025-06-13 ENCOUNTER — PATIENT ROUNDING (BHMG ONLY) (OUTPATIENT)
Dept: BARIATRICS/WEIGHT MGMT | Facility: CLINIC | Age: 35
End: 2025-06-13
Payer: COMMERCIAL

## 2025-06-13 NOTE — PROGRESS NOTES
A [employer] message has been sent to the patient for PATIENT ROUNDING for [facility].

## 2025-06-24 ENCOUNTER — OFFICE VISIT (OUTPATIENT)
Age: 35
End: 2025-06-24
Payer: COMMERCIAL

## 2025-06-24 VITALS
BODY MASS INDEX: 30.16 KG/M2 | WEIGHT: 181 LBS | SYSTOLIC BLOOD PRESSURE: 112 MMHG | HEART RATE: 74 BPM | HEIGHT: 65 IN | DIASTOLIC BLOOD PRESSURE: 76 MMHG

## 2025-06-24 DIAGNOSIS — E66.811 CLASS 1 OBESITY DUE TO EXCESS CALORIES WITHOUT SERIOUS COMORBIDITY WITH BODY MASS INDEX (BMI) OF 30.0 TO 30.9 IN ADULT: Primary | ICD-10-CM

## 2025-06-24 DIAGNOSIS — E66.09 CLASS 1 OBESITY DUE TO EXCESS CALORIES WITHOUT SERIOUS COMORBIDITY WITH BODY MASS INDEX (BMI) OF 30.0 TO 30.9 IN ADULT: Primary | ICD-10-CM

## 2025-06-24 PROCEDURE — 99214 OFFICE O/P EST MOD 30 MIN: CPT | Performed by: NURSE PRACTITIONER

## 2025-06-24 NOTE — ASSESSMENT & PLAN NOTE
Patient's (Body mass index is 30.59 kg/m².) indicates that they are obese (BMI >30) with health conditions that include none . Weight is unchanged. BMI  is above average; BMI management plan is completed. We discussed low calorie, low carb based diet program, portion control, increasing exercise, and an lee-based approach such as [employer] Pal or Lose It.       The current plan for this month includes:   - Adjust exercise as discussed  - Weight loss goal 4-6lbs this month  - Continue to work on lifestyle behavioral changes  - Continue nutrition focus  - Adjust macronutrients as discussed. Bring food journal to next visit for review  Nutritional recommendations and goals were reviewed including Calories: 1758-3503  daily adjusted for exercise calories burnt, Protein:100 g daily, Net carbs (total carb - fiber) of 75 g per day.  >>Has done well in protein intake, keep up the great effort. Goal this month is to continue with protein intake while decreasing net carb intake.

## 2025-06-24 NOTE — PROGRESS NOTES
[facility]  [address] [place] [id number]    Office Note Follow Up      Date: 2025  Patient Name: Viktoria Stauffer  MRN: [id number]  : 1990    Subjective     Chief Complaint  Obesity Management follow-up    Viktoria Stauffer presents to [facility] for obesity management.     Patient is unsure with weight loss progress. Appetite is moderately controlled.   Currently taking no medications from [facility], nutrition focus only. She has considered medication use but, at this time does not wish to take medications.   The patient is not taking multivitamin and is not taking fish oil.  The patient is using a food journal.    Average daily calories: 1866  Average daily protein: 116  Average daily net carbs: 157    The patient is exercising with a FITT score of:    Frequency Intensity Time Strength Training   []   0, none []   0 []   0 []   0   [x]   1 (1-2x/week) [x]   1 (light) []   1 (<10 min) [x]   1 (1x/week)   []   2 (3-5x/week) []   2 (moderate) [x]   2 (10-20 min) []   2 (2x/week)   []   3 (daily) []   3 (moderately hard)  []   4 (very hard) []   3 (20-30 min)  []   4 (>30 min) []   3 (3-4x/week)     Review of Systems   Constitutional:  Negative for appetite change and fatigue.   Eyes:  Negative for visual disturbance.   Cardiovascular:  Negative for chest pain and palpitations.   Gastrointestinal:  Negative for constipation and indigestion.   Neurological:  Negative for light-headedness.   All other systems reviewed and are negative.      Objective   Start weight: 181.2 pounds.    Total Loss lb/%Loss of beginning body weight (BBW): -0.2 lb/-0.11 %  Change in weight since last visit: -0.2     Recent Weight History:   Wt Readings from Last 6 Encounters:   25 82.1 kg (181 lb)   25 82.2 kg (181 lb 3.2 oz)   25 82.9 kg (182 lb 11.2 oz)   24 88.9 kg (195 lb 14.4 oz)   24 88.5 kg (195 lb)   24 88.7 kg (195 lb 9.6 oz)  "        Body mass index is 30.59 kg/m².   Body composition analysis completed and showed:   Body Fat %: 40.9    Measurements (in inches)  Waist Circumference: 38    Vital Signs:   /76 (BP Location: Left arm, Patient Position: Sitting)   Pulse 74   Ht 163.8 cm (64.5\")   Wt 82.1 kg (181 lb)   BMI 30.59 kg/m²       Physical Exam  Constitutional:       Appearance: Normal appearance.   HENT:      Head: Normocephalic and atraumatic.   Pulmonary:      Effort: Pulmonary effort is normal.   Neurological:      Mental Status: She is alert and oriented to person, place, and time.   Psychiatric:         Mood and Affect: Mood normal.         Thought Content: Thought content normal.            Assessment / Plan        Diagnoses and all orders for this visit:    1. Class 1 obesity due to excess calories without serious comorbidity with body mass index (BMI) of 30.0 to 30.9 in adult (Primary)  Assessment & Plan:  Patient's (Body mass index is 30.59 kg/m².) indicates that they are obese (BMI >30) with health conditions that include none . Weight is unchanged. BMI  is above average; BMI management plan is completed. We discussed low calorie, low carb based diet program, portion control, increasing exercise, and an lee-based approach such as [facility] Pal or Lose It.       The current plan for this month includes:   - Adjust exercise as discussed  - Weight loss goal 4-6lbs this month  - Continue to work on lifestyle behavioral changes  - Continue nutrition focus  - Adjust macronutrients as discussed. Bring food journal to next visit for review  Nutritional recommendations and goals were reviewed including Calories: 5358-0387  daily adjusted for exercise calories burnt, Protein:100 g daily, Net carbs (total carb - fiber) of 75 g per day.  >>Has done well in protein intake, keep up the great effort. Goal this month is to continue with protein intake while decreasing net carb intake.            We discussed the risks, benefits, " and limitations of treatments. Continue medications and OTC supplements as discussed. Patient verbalizes understanding of and agreement with management plan.     Follow Up   Return in about 4 weeks (around 7/22/2025).  Patient was given instructions and counseling regarding her condition or for health maintenance advice. Please see specific information pulled into the AVS if appropriate.     I spent 30 minutes on this date of service. This time includes time spent by me in the following activities:preparing for the visit, counseling and educating the patient/family/caregiver, ordering medications, tests, or procedures and documenting information in the medical record.    Tyrese Garcia, APRN  06/24/2025

## 2025-07-24 ENCOUNTER — OFFICE VISIT (OUTPATIENT)
Age: 35
End: 2025-07-24
Payer: COMMERCIAL

## 2025-07-24 VITALS
HEIGHT: 65 IN | WEIGHT: 179.6 LBS | HEART RATE: 79 BPM | DIASTOLIC BLOOD PRESSURE: 76 MMHG | BODY MASS INDEX: 29.92 KG/M2 | SYSTOLIC BLOOD PRESSURE: 104 MMHG

## 2025-07-24 DIAGNOSIS — E66.811 CLASS 1 OBESITY DUE TO EXCESS CALORIES WITHOUT SERIOUS COMORBIDITY WITH BODY MASS INDEX (BMI) OF 30.0 TO 30.9 IN ADULT: Primary | ICD-10-CM

## 2025-07-24 DIAGNOSIS — E66.09 CLASS 1 OBESITY DUE TO EXCESS CALORIES WITHOUT SERIOUS COMORBIDITY WITH BODY MASS INDEX (BMI) OF 30.0 TO 30.9 IN ADULT: Primary | ICD-10-CM

## 2025-07-24 PROCEDURE — 99214 OFFICE O/P EST MOD 30 MIN: CPT | Performed by: NURSE PRACTITIONER

## 2025-07-24 NOTE — PROGRESS NOTES
[facility]  [address] [place] [id number]    Office Note Follow Up      Date: 2025  Patient Name: Viktoria Stauffer  MRN: [id number]  : 1990    Subjective     Chief Complaint  Obesity Management follow-up    Viktoria Stauffer presents to [facility] for obesity management.     Patient is unsatisfied with weight loss progress. Appetite is moderately controlled. Currently taking no medications from [facility], nutrition focus only. She admits to having problems with self-control and not eating snack foods and meals she prepares for her family.  The patient is not taking multivitamin and is not taking fish oil.  The patient is not using a food journal.  The patient is exercising with a FITT score of:    Frequency Intensity Time Strength Training   []   0, none []   0 []   0 []   0   [x]   1 (1-2x/week) [x]   1 (light) []   1 (<10 min) [x]   1 (1x/week)   []   2 (3-5x/week) []   2 (moderate) []   2 (10-20 min) []   2 (2x/week)   []   3 (daily) []   3 (moderately hard)  []   4 (very hard) [x]   3 (20-30 min)  [x]   4 (>30 min) []   3 (3-4x/week)     Review of Systems   Constitutional:  Negative for appetite change and fatigue.   Eyes:  Negative for visual disturbance.   Cardiovascular:  Negative for chest pain and palpitations.   Gastrointestinal:  Negative for constipation and indigestion.   Neurological:  Negative for light-headedness.   All other systems reviewed and are negative.      Objective   Start weight: 181.2 pounds.    Total Loss lb/%Loss of beginning body weight (BBW): -1.6 lb/-0.88 %  Change in weight since last visit: -1.4    Recent Weight History:   Wt Readings from Last 6 Encounters:   25 81.5 kg (179 lb 9.6 oz)   25 82.1 kg (181 lb)   25 82.2 kg (181 lb 3.2 oz)   25 82.9 kg (182 lb 11.2 oz)   24 88.9 kg (195 lb 14.4 oz)   24 88.5 kg (195 lb)         Body mass index is 30.35 kg/m².   Body composition  "analysis completed and showed:   Body Fat %: 42.2    Measurements (in inches)  Waist Circumference: 37    Vital Signs:   /76 (BP Location: Left arm, Patient Position: Sitting)   Pulse 79   Ht 163.8 cm (64.5\")   Wt 81.5 kg (179 lb 9.6 oz)   BMI 30.35 kg/m²       Physical Exam  Constitutional:       Appearance: Normal appearance.   HENT:      Head: Normocephalic and atraumatic.   Pulmonary:      Effort: Pulmonary effort is normal.   Neurological:      Mental Status: She is alert and oriented to person, place, and time.   Psychiatric:         Mood and Affect: Mood normal.         Thought Content: Thought content normal.        Result Review :                Assessment / Plan        Diagnoses and all orders for this visit:    1. Class 1 obesity due to excess calories without serious comorbidity with body mass index (BMI) of 30.0 to 30.9 in adult (Primary)  Assessment & Plan:  Patient's (Body mass index is 30.35 kg/m².) indicates that they are obese (BMI >30) with health conditions that include none . Weight is improving with lifestyle modifications. BMI  is above average; BMI management plan is completed. We discussed low calorie, low carb based diet program, portion control, increasing exercise, and an lee-based approach such as [employer] Pal or Lose It.       The current plan for this month includes:   - Continue current exercise efforts  - It is appropriate to begin anorectic medications as prescribed at this time  >>Currently taking Wellbutrin 150 mg daily for anxiety, will increase to 150 mg BID to see if this will help with cravings. May consider adding naltrexone at later date.   - Increase water to recommended daily amount  - Weight loss goal 4-6lbs this month  - Adjust macronutrients as discussed. Bring food journal to next visit for review  >>Encouraged to resume journaling            We discussed the risks, benefits, and limitations of treatments. Continue medications and OTC supplements as " discussed. Patient verbalizes understanding of and agreement with management plan.     Follow Up   Return in about 4 weeks (around 8/21/2025).  Patient was given instructions and counseling regarding her condition or for health maintenance advice. Please see specific information pulled into the AVS if appropriate.     I spent 35 minutes on this date of service. This time includes time spent by me in the following activities:preparing for the visit, counseling and educating the patient/family/caregiver, ordering medications, tests, or procedures and documenting information in the medical record.    Tyrese Garcia, TOAN  07/24/2025

## 2025-07-24 NOTE — ASSESSMENT & PLAN NOTE
Patient's (Body mass index is 30.35 kg/m².) indicates that they are obese (BMI >30) with health conditions that include none . Weight is improving with lifestyle modifications. BMI  is above average; BMI management plan is completed. We discussed low calorie, low carb based diet program, portion control, increasing exercise, and an lee-based approach such as [employer] Pal or Lose It.       The current plan for this month includes:   - Continue current exercise efforts  - It is appropriate to begin anorectic medications as prescribed at this time  >>Currently taking Wellbutrin 150 mg daily for anxiety, will increase to 150 mg BID to see if this will help with cravings. May consider adding naltrexone at later date.   - Increase water to recommended daily amount  - Weight loss goal 4-6lbs this month  - Adjust macronutrients as discussed. Bring food journal to next visit for review  >>Encouraged to resume journaling

## 2025-08-04 DIAGNOSIS — E66.811 CLASS 1 OBESITY DUE TO EXCESS CALORIES WITHOUT SERIOUS COMORBIDITY WITH BODY MASS INDEX (BMI) OF 31.0 TO 31.9 IN ADULT: ICD-10-CM

## 2025-08-04 DIAGNOSIS — E66.09 CLASS 1 OBESITY DUE TO EXCESS CALORIES WITHOUT SERIOUS COMORBIDITY WITH BODY MASS INDEX (BMI) OF 31.0 TO 31.9 IN ADULT: ICD-10-CM

## 2025-08-04 RX ORDER — BUPROPION HYDROCHLORIDE 150 MG/1
150 TABLET ORAL DAILY
Qty: 30 TABLET | Refills: 0 | Status: SHIPPED | OUTPATIENT
Start: 2025-08-04

## 2025-08-15 DIAGNOSIS — Z00.00 ENCOUNTER FOR WELLNESS EXAMINATION IN ADULT: Primary | ICD-10-CM

## 2025-08-19 ENCOUNTER — LAB (OUTPATIENT)
Dept: FAMILY MEDICINE CLINIC | Facility: CLINIC | Age: 35
End: 2025-08-19
Payer: COMMERCIAL

## 2025-08-19 DIAGNOSIS — Z00.00 ENCOUNTER FOR WELLNESS EXAMINATION IN ADULT: ICD-10-CM

## 2025-08-25 ENCOUNTER — TELEPHONE (OUTPATIENT)
Dept: FAMILY MEDICINE CLINIC | Facility: CLINIC | Age: 35
End: 2025-08-25

## 2025-08-25 ENCOUNTER — OFFICE VISIT (OUTPATIENT)
Dept: FAMILY MEDICINE CLINIC | Facility: CLINIC | Age: 35
End: 2025-08-25
Payer: COMMERCIAL

## 2025-08-25 VITALS
OXYGEN SATURATION: 98 % | HEART RATE: 104 BPM | SYSTOLIC BLOOD PRESSURE: 118 MMHG | BODY MASS INDEX: 31.65 KG/M2 | DIASTOLIC BLOOD PRESSURE: 82 MMHG | WEIGHT: 185.4 LBS | HEIGHT: 64 IN

## 2025-08-25 DIAGNOSIS — F32.A DEPRESSION, UNSPECIFIED DEPRESSION TYPE: ICD-10-CM

## 2025-08-25 DIAGNOSIS — E66.811 CLASS 1 OBESITY DUE TO EXCESS CALORIES WITHOUT SERIOUS COMORBIDITY WITH BODY MASS INDEX (BMI) OF 30.0 TO 30.9 IN ADULT: Primary | ICD-10-CM

## 2025-08-25 DIAGNOSIS — E66.09 CLASS 1 OBESITY DUE TO EXCESS CALORIES WITHOUT SERIOUS COMORBIDITY WITH BODY MASS INDEX (BMI) OF 30.0 TO 30.9 IN ADULT: Primary | ICD-10-CM

## 2025-08-25 DIAGNOSIS — Z00.00 ENCOUNTER FOR WELLNESS EXAMINATION IN ADULT: ICD-10-CM

## 2025-08-25 PROCEDURE — 99214 OFFICE O/P EST MOD 30 MIN: CPT | Performed by: STUDENT IN AN ORGANIZED HEALTH CARE EDUCATION/TRAINING PROGRAM

## 2025-08-25 RX ORDER — NALTREXONE HYDROCHLORIDE AND BUPROPION HYDROCHLORIDE 8; 90 MG/1; MG/1
TABLET, EXTENDED RELEASE ORAL
Qty: 70 TABLET | Refills: 0 | Status: SHIPPED | OUTPATIENT
Start: 2025-08-25

## 2025-08-25 RX ORDER — BUPROPION HYDROCHLORIDE 100 MG/1
TABLET, EXTENDED RELEASE ORAL
Qty: 10 TABLET | Refills: 0 | Status: SHIPPED | OUTPATIENT
Start: 2025-08-25 | End: 2025-08-25

## 2025-08-27 ENCOUNTER — LAB (OUTPATIENT)
Dept: FAMILY MEDICINE CLINIC | Facility: CLINIC | Age: 35
End: 2025-08-27
Payer: COMMERCIAL

## 2025-08-28 LAB
25(OH)D3+25(OH)D2 SERPL-MCNC: 69.3 NG/ML (ref 30–100)
ALBUMIN SERPL-MCNC: 4.5 G/DL (ref 3.9–4.9)
ALP SERPL-CCNC: 91 IU/L (ref 44–121)
ALT SERPL-CCNC: 29 IU/L (ref 0–32)
AST SERPL-CCNC: 21 IU/L (ref 0–40)
BASOPHILS # BLD AUTO: 0 X10E3/UL (ref 0–0.2)
BASOPHILS NFR BLD AUTO: 1 %
BILIRUB SERPL-MCNC: 0.3 MG/DL (ref 0–1.2)
BUN SERPL-MCNC: 14 MG/DL (ref 6–20)
BUN/CREAT SERPL: 15 (ref 9–23)
CALCIUM SERPL-MCNC: 9.3 MG/DL (ref 8.7–10.2)
CHLORIDE SERPL-SCNC: 103 MMOL/L (ref 96–106)
CHOLEST SERPL-MCNC: 215 MG/DL (ref 100–199)
CO2 SERPL-SCNC: 21 MMOL/L (ref 20–29)
CREAT SERPL-MCNC: 0.91 MG/DL (ref 0.57–1)
EGFRCR SERPLBLD CKD-EPI 2021: 83 ML/MIN/1.73
EOSINOPHIL # BLD AUTO: 0.1 X10E3/UL (ref 0–0.4)
EOSINOPHIL NFR BLD AUTO: 1 %
ERYTHROCYTE [DISTWIDTH] IN BLOOD BY AUTOMATED COUNT: 12.7 % (ref 11.7–15.4)
GLOBULIN SER CALC-MCNC: 2.5 G/DL (ref 1.5–4.5)
GLUCOSE SERPL-MCNC: 83 MG/DL (ref 70–99)
HBA1C MFR BLD: 5.1 % (ref 4.8–5.6)
HCT VFR BLD AUTO: 45.1 % (ref 34–46.6)
HDLC SERPL-MCNC: 60 MG/DL
HGB BLD-MCNC: 14.5 G/DL (ref 11.1–15.9)
IMM GRANULOCYTES # BLD AUTO: 0 X10E3/UL (ref 0–0.1)
IMM GRANULOCYTES NFR BLD AUTO: 0 %
LDLC SERPL CALC-MCNC: 142 MG/DL (ref 0–99)
LYMPHOCYTES # BLD AUTO: 1.9 X10E3/UL (ref 0.7–3.1)
LYMPHOCYTES NFR BLD AUTO: 28 %
MCH RBC QN AUTO: 28.8 PG (ref 26.6–33)
MCHC RBC AUTO-ENTMCNC: 32.2 G/DL (ref 31.5–35.7)
MCV RBC AUTO: 90 FL (ref 79–97)
MONOCYTES # BLD AUTO: 0.4 X10E3/UL (ref 0.1–0.9)
MONOCYTES NFR BLD AUTO: 7 %
NEUTROPHILS # BLD AUTO: 4.3 X10E3/UL (ref 1.4–7)
NEUTROPHILS NFR BLD AUTO: 63 %
PLATELET # BLD AUTO: 276 X10E3/UL (ref 150–450)
POTASSIUM SERPL-SCNC: 4.4 MMOL/L (ref 3.5–5.2)
PROT SERPL-MCNC: 7 G/DL (ref 6–8.5)
RBC # BLD AUTO: 5.03 X10E6/UL (ref 3.77–5.28)
SODIUM SERPL-SCNC: 139 MMOL/L (ref 134–144)
TRIGL SERPL-MCNC: 76 MG/DL (ref 0–149)
TSH SERPL DL<=0.005 MIU/L-ACNC: 1.36 UIU/ML (ref 0.45–4.5)
VLDLC SERPL CALC-MCNC: 13 MG/DL (ref 5–40)
WBC # BLD AUTO: 6.8 X10E3/UL (ref 3.4–10.8)